# Patient Record
Sex: FEMALE | Race: WHITE
[De-identification: names, ages, dates, MRNs, and addresses within clinical notes are randomized per-mention and may not be internally consistent; named-entity substitution may affect disease eponyms.]

---

## 2017-10-07 ENCOUNTER — HEALTH MAINTENANCE LETTER (OUTPATIENT)
Age: 37
End: 2017-10-07

## 2018-10-22 ENCOUNTER — ALLIED HEALTH/NURSE VISIT (OUTPATIENT)
Dept: NURSING | Facility: CLINIC | Age: 38
End: 2018-10-22
Payer: COMMERCIAL

## 2018-10-22 DIAGNOSIS — Z23 NEED FOR PROPHYLACTIC VACCINATION AND INOCULATION AGAINST INFLUENZA: Primary | ICD-10-CM

## 2018-10-22 PROCEDURE — 90471 IMMUNIZATION ADMIN: CPT

## 2018-10-22 PROCEDURE — 90686 IIV4 VACC NO PRSV 0.5 ML IM: CPT

## 2018-10-22 PROCEDURE — 99207 ZZC NO CHARGE NURSE ONLY: CPT

## 2018-10-22 NOTE — MR AVS SNAPSHOT
After Visit Summary   10/22/2018    Little Ludwig    MRN: [id number]           Patient Information     Date Of Birth          1980        Visit Information        Provider Department      10/22/2018 2:50 PM [facility]        Today's Diagnoses     Need for prophylactic vaccination and inoculation against influenza    -  1       Follow-ups after your visit        Who to contact     If you have questions or need follow up information about today's clinic visit or your schedule please contact [facility] directly at [phone].  Normal or non-critical lab and imaging results will be communicated to you by [employer], letter or phone within 4 business days after the clinic has received the results. If you do not hear from us within 7 days, please contact the clinic through [employer] or phone. If you have a critical or abnormal lab result, we will notify you by phone as soon as possible.  Submit refill requests through "[employer] or call your pharmacy and they will forward the refill request to us. Please allow 3 business days for your refill to be completed.          Additional Information About Your Visit        MyChart Information     "[employer] gives you secure access to your electronic health record. If you see a primary care provider, you can also send messages to your care team and make appointments. If you have questions, please call your primary care clinic.  If you do not have a primary care provider, please call [phone] and they will assist you.        Care EveryWhere ID     This is your Care EveryWhere ID. This could be used by other organizations to access your [facility] medical records  [id number]         Blood Pressure from Last 3 Encounters:   10/07/16 100/66   10/14/15 114/58   08/18/15 118/73    Weight from Last 3 Encounters:   10/07/16 185 lb (83.9 kg)   10/14/15 184 lb 8 oz (83.7 kg)   08/18/15 181 lb 1.6  oz (82.1 kg)              We Performed the Following     FLU VACCINE, SPLIT VIRUS, IM (QUADRIVALENT) [97377]- >3 YRS     Vaccine Administration, Initial [67363]        Primary Care Provider    None Specified       No primary provider on file.        Equal Access to Services     TEX MO : Hadii aad ku haddixon Rubio, waottoda luqadaha, qaybta kaalmada ismael, cherelle kelinin hayaachristopher lind etta sola johnson. So [facility] [phone].    ATENCIÓN: Si habla español, tiene a siegel disposición servicios gratuitos de asistencia lingüística. Llame al [phone].    We comply with applicable federal civil rights laws and [place] laws. We do not discriminate on the basis of race, color, national origin, age, disability, sex, sexual orientation, or gender identity.            Thank you!     Thank you for choosing [facility]  for your care. Our goal is always to provide you with excellent care. Hearing back from our patients is one way we can continue to improve our services. Please take a few minutes to complete the written survey that you may receive in the mail after your visit with us. Thank you!             Your Updated Medication List - Protect others around you: Learn how to safely use, store and throw away your medicines at www.disposemymeds.org.          This list is accurate as of 10/22/18  3:01 PM.  Always use your most recent med list.                   Brand Name Dispense Instructions for use Diagnosis    CALCIUM-MAGNESIUM PO           cyanocobalamin 1000 MCG tablet    vitamin  B-12     Take 1 tablet by mouth daily.        Ferrous Sulfate Dried 143 (45 Fe) MG Tbcr      Take 1 tablet by mouth daily        FOLIC ACID PO      Take 1 mg by mouth daily        guaiFENesin-codeine 100-10 MG/5ML Soln solution    ROBITUSSIN AC    120 mL    Take 10 mLs by mouth every 4 hours as needed for cough    Cough       NATURE-THROID PO           OMEGA-3 FISH OIL PO      Take 1 g by mouth        prenatal  multivitamin plus iron 27-0.8 MG Tabs per tablet      Take 1 tablet by mouth daily        VITAMIN C PO           VITAMIN D3 PO      Take 2,000 Units by mouth daily

## 2018-10-22 NOTE — PROGRESS NOTES

## 2019-10-01 ENCOUNTER — HEALTH MAINTENANCE LETTER (OUTPATIENT)
Age: 39
End: 2019-10-01

## 2021-01-15 ENCOUNTER — HEALTH MAINTENANCE LETTER (OUTPATIENT)
Age: 41
End: 2021-01-15

## 2021-09-04 ENCOUNTER — HEALTH MAINTENANCE LETTER (OUTPATIENT)
Age: 41
End: 2021-09-04

## 2022-02-19 ENCOUNTER — HEALTH MAINTENANCE LETTER (OUTPATIENT)
Age: 42
End: 2022-02-19

## 2022-10-22 ENCOUNTER — HEALTH MAINTENANCE LETTER (OUTPATIENT)
Age: 42
End: 2022-10-22

## 2023-04-01 ENCOUNTER — HEALTH MAINTENANCE LETTER (OUTPATIENT)
Age: 43
End: 2023-04-01

## 2025-03-21 ENCOUNTER — HOSPITAL ENCOUNTER (EMERGENCY)
Facility: CLINIC | Age: 45
Discharge: HOME | End: 2025-03-21
Attending: EMERGENCY MEDICINE | Admitting: EMERGENCY MEDICINE
Payer: COMMERCIAL

## 2025-03-21 VITALS
RESPIRATION RATE: 25 BRPM | OXYGEN SATURATION: 99 % | SYSTOLIC BLOOD PRESSURE: 133 MMHG | HEART RATE: 94 BPM | DIASTOLIC BLOOD PRESSURE: 91 MMHG | TEMPERATURE: 97.8 F

## 2025-03-21 DIAGNOSIS — J06.9 UPPER RESPIRATORY TRACT INFECTION, UNSPECIFIED TYPE: ICD-10-CM

## 2025-03-21 DIAGNOSIS — J98.01 ACUTE BRONCHOSPASM: ICD-10-CM

## 2025-03-21 LAB
FLUAV RNA SPEC QL NAA+PROBE: NEGATIVE
FLUBV RNA RESP QL NAA+PROBE: NEGATIVE
RSV RNA SPEC NAA+PROBE: NEGATIVE
SARS-COV-2 RNA RESP QL NAA+PROBE: NEGATIVE

## 2025-03-21 PROCEDURE — 99283 EMERGENCY DEPT VISIT LOW MDM: CPT

## 2025-03-21 PROCEDURE — 87637 SARSCOV2&INF A&B&RSV AMP PRB: CPT | Performed by: EMERGENCY MEDICINE

## 2025-03-21 ASSESSMENT — COLUMBIA-SUICIDE SEVERITY RATING SCALE - C-SSRS
1. IN THE PAST MONTH, HAVE YOU WISHED YOU WERE DEAD OR WISHED YOU COULD GO TO SLEEP AND NOT WAKE UP?: NO
6. HAVE YOU EVER DONE ANYTHING, STARTED TO DO ANYTHING, OR PREPARED TO DO ANYTHING TO END YOUR LIFE?: NO
2. HAVE YOU ACTUALLY HAD ANY THOUGHTS OF KILLING YOURSELF IN THE PAST MONTH?: NO

## 2025-03-21 ASSESSMENT — ACTIVITIES OF DAILY LIVING (ADL)
ADLS_ACUITY_SCORE: 41
ADLS_ACUITY_SCORE: 41

## 2025-03-22 ENCOUNTER — HEALTH MAINTENANCE LETTER (OUTPATIENT)
Age: 45
End: 2025-03-22

## 2025-03-22 NOTE — ED TRIAGE NOTES
Non-stop Bronchospastic cough for the last 2 hours. Had finished a course of steroids yesterday that was taking for bronchitis.      Triage Assessment (Adult)       Row Name 03/21/25 2051          Triage Assessment    Airway WDL WDL        Respiratory WDL    Respiratory WDL X;cough     Cough Frequency infrequent     Cough Type bronchospastic        Skin Circulation/Temperature WDL    Skin Circulation/Temperature WDL X  flushed        Cardiac WDL    Cardiac WDL WDL        Peripheral/Neurovascular WDL    Peripheral Neurovascular WDL WDL        Cognitive/Neuro/Behavioral WDL    Cognitive/Neuro/Behavioral WDL WDL

## 2025-03-22 NOTE — ED PROVIDER NOTES
Emergency Department Note      History of Present Illness     Chief Complaint   Cough      HPI   Little Ludwig is a 44 year old female presenting with cough. Little has had a cough for the past week with other cold symptoms. She developed laryngitis and went to urgent care where she was prescribed 3 days of prednisone 40mg without improvement. Around 1930 she developed a persistent cough where it was trouble to breath and got slightly panicked. The patient denies any history of COPD, asthma, or hypertension, or fever. The patient reports that she has been taking Mucinex for her symptoms.     Independent Historian   None    Review of External Notes   N/A    Past Medical History     Medical History and Problem List   Hx of DVT  Anxiety and depression     Medications   Atarax  Lexapro     Surgical History   Appendectomy   Mammoplasty reduction     Physical Exam     Patient Vitals for the past 24 hrs:   BP Temp Temp src Pulse Resp SpO2   03/21/25 2050 (!) 133/91 97.8  F (36.6  C) Temporal 94 25 99 %     Physical Exam  General: Alert and cooperative with exam. Patient in mild distress. Normal mentation.  Nontoxic appearance  Head:  Scalp is NC/AT  Eyes:  No scleral icterus, PERRL  ENT:  The external nose and ears are normal. The oropharynx is normal and without erythema; mucus membranes are moist. Uvula midline, no evidence of deep space infection. Moderate dry cough.   Neck:  Normal range of motion without rigidity.  CV:  Regular rate and rhythm    No pathologic murmur   Resp:  Breath sounds are clear bilaterally    Non-labored, no retractions or accessory muscle use  GI:  Abdomen is soft, no distension, no tenderness. No peritoneal signs  MS:  No lower extremity edema   Skin:  Warm and dry, No rash or lesions noted.  Neuro: Oriented x 3. No gross motor deficits.     Diagnostics     Lab Results   Labs Ordered and Resulted from Time of ED Arrival to Time of ED Departure   INFLUENZA A/B, RSV AND SARS-COV2 PCR -  Normal       Result Value    Influenza A PCR Negative      Influenza B PCR Negative      RSV PCR Negative      SARS CoV2 PCR Negative         Imaging   No orders to display       Independent Interpretation   None    ED Course      Medications Administered   Medications - No data to display    Procedures   Procedures     Discussion of Management   None    ED Course   ED Course as of 03/21/25 2157   Fri Mar 21, 2025   2123 I obtained the history and examined the patient as noted above.          Additional Documentation  None    Medical Decision Making / Diagnosis     CMS Diagnoses: None    MIPS       None    MDM   Little Ludwig is a 44 year old female Little Ludwig is a 44 year old female who presents for evaluation of cough.  This is consistent with an upper respiratory tract infection as well as episode of bronchospasm.  There is no signs at this point of serious bacterial infection such as OM, RPA, epiglottitis, PTA, strep pharyngitis, pneumonia, sinusitis, meningitis, bacteremia.  Given clear lungs, fever curve, no hypoxia and no respiratory distress I do not feel patient needs a CXR at this point as the probability of bacterial pneumonia is very unlikely.   Close followup with primary care physician is indicated if not improving.  Return precautions and supportive care discussed.      Disposition   The patient was discharged.     Diagnosis     ICD-10-CM    1. Acute bronchospasm  J98.01       2. Upper respiratory tract infection, unspecified type  J06.9              Scribe Disclosure:  I, Nuvia Saldana, am serving as a scribe at 9:37 PM on 3/21/2025 to document services personally performed by Matty Atkins DO based on my observations and the provider's statements to me.        Matty Aktins DO  03/22/25 0012